# Patient Record
Sex: MALE | URBAN - METROPOLITAN AREA
[De-identification: names, ages, dates, MRNs, and addresses within clinical notes are randomized per-mention and may not be internally consistent; named-entity substitution may affect disease eponyms.]

---

## 2022-07-20 ENCOUNTER — EMERGENCY (EMERGENCY)
Facility: HOSPITAL | Age: 37
LOS: 1 days | Discharge: HOME | End: 2022-07-20
Attending: EMERGENCY MEDICINE | Admitting: EMERGENCY MEDICINE

## 2022-07-20 VITALS
SYSTOLIC BLOOD PRESSURE: 126 MMHG | DIASTOLIC BLOOD PRESSURE: 65 MMHG | HEART RATE: 85 BPM | OXYGEN SATURATION: 98 % | TEMPERATURE: 98 F | RESPIRATION RATE: 18 BRPM

## 2022-07-20 LAB
ALBUMIN SERPL ELPH-MCNC: 4.5 G/DL — SIGNIFICANT CHANGE UP (ref 3.3–5)
ALP SERPL-CCNC: 64 U/L — SIGNIFICANT CHANGE UP (ref 40–120)
ALT FLD-CCNC: 18 U/L — SIGNIFICANT CHANGE UP (ref 4–41)
ANION GAP SERPL CALC-SCNC: 13 MMOL/L — SIGNIFICANT CHANGE UP (ref 7–14)
APPEARANCE UR: CLEAR — SIGNIFICANT CHANGE UP
AST SERPL-CCNC: 24 U/L — SIGNIFICANT CHANGE UP (ref 4–40)
BASOPHILS # BLD AUTO: 0.03 K/UL — SIGNIFICANT CHANGE UP (ref 0–0.2)
BASOPHILS NFR BLD AUTO: 0.3 % — SIGNIFICANT CHANGE UP (ref 0–2)
BILIRUB SERPL-MCNC: 0.3 MG/DL — SIGNIFICANT CHANGE UP (ref 0.2–1.2)
BILIRUB UR-MCNC: NEGATIVE — SIGNIFICANT CHANGE UP
BUN SERPL-MCNC: 11 MG/DL — SIGNIFICANT CHANGE UP (ref 7–23)
CALCIUM SERPL-MCNC: 9.4 MG/DL — SIGNIFICANT CHANGE UP (ref 8.4–10.5)
CHLORIDE SERPL-SCNC: 103 MMOL/L — SIGNIFICANT CHANGE UP (ref 98–107)
CO2 SERPL-SCNC: 27 MMOL/L — SIGNIFICANT CHANGE UP (ref 22–31)
COLOR SPEC: COLORLESS — SIGNIFICANT CHANGE UP
CREAT SERPL-MCNC: 1.3 MG/DL — SIGNIFICANT CHANGE UP (ref 0.5–1.3)
DIFF PNL FLD: NEGATIVE — SIGNIFICANT CHANGE UP
EGFR: 73 ML/MIN/1.73M2 — SIGNIFICANT CHANGE UP
EOSINOPHIL # BLD AUTO: 0.31 K/UL — SIGNIFICANT CHANGE UP (ref 0–0.5)
EOSINOPHIL NFR BLD AUTO: 3.5 % — SIGNIFICANT CHANGE UP (ref 0–6)
GLUCOSE SERPL-MCNC: 86 MG/DL — SIGNIFICANT CHANGE UP (ref 70–99)
GLUCOSE UR QL: NEGATIVE — SIGNIFICANT CHANGE UP
HCT VFR BLD CALC: 47.7 % — SIGNIFICANT CHANGE UP (ref 39–50)
HGB BLD-MCNC: 16.1 G/DL — SIGNIFICANT CHANGE UP (ref 13–17)
IANC: 5.48 K/UL — SIGNIFICANT CHANGE UP (ref 1.8–7.4)
IMM GRANULOCYTES NFR BLD AUTO: 0.2 % — SIGNIFICANT CHANGE UP (ref 0–1.5)
KETONES UR-MCNC: NEGATIVE — SIGNIFICANT CHANGE UP
LEUKOCYTE ESTERASE UR-ACNC: NEGATIVE — SIGNIFICANT CHANGE UP
LYMPHOCYTES # BLD AUTO: 2.31 K/UL — SIGNIFICANT CHANGE UP (ref 1–3.3)
LYMPHOCYTES # BLD AUTO: 25.8 % — SIGNIFICANT CHANGE UP (ref 13–44)
MCHC RBC-ENTMCNC: 29.7 PG — SIGNIFICANT CHANGE UP (ref 27–34)
MCHC RBC-ENTMCNC: 33.8 GM/DL — SIGNIFICANT CHANGE UP (ref 32–36)
MCV RBC AUTO: 87.8 FL — SIGNIFICANT CHANGE UP (ref 80–100)
MONOCYTES # BLD AUTO: 0.82 K/UL — SIGNIFICANT CHANGE UP (ref 0–0.9)
MONOCYTES NFR BLD AUTO: 9.1 % — SIGNIFICANT CHANGE UP (ref 2–14)
NEUTROPHILS # BLD AUTO: 5.48 K/UL — SIGNIFICANT CHANGE UP (ref 1.8–7.4)
NEUTROPHILS NFR BLD AUTO: 61.1 % — SIGNIFICANT CHANGE UP (ref 43–77)
NITRITE UR-MCNC: NEGATIVE — SIGNIFICANT CHANGE UP
NRBC # BLD: 0 /100 WBCS — SIGNIFICANT CHANGE UP
NRBC # FLD: 0 K/UL — SIGNIFICANT CHANGE UP
PH UR: 6.5 — SIGNIFICANT CHANGE UP (ref 5–8)
PLATELET # BLD AUTO: 300 K/UL — SIGNIFICANT CHANGE UP (ref 150–400)
POTASSIUM SERPL-MCNC: 3.9 MMOL/L — SIGNIFICANT CHANGE UP (ref 3.5–5.3)
POTASSIUM SERPL-SCNC: 3.9 MMOL/L — SIGNIFICANT CHANGE UP (ref 3.5–5.3)
PROT SERPL-MCNC: 7.1 G/DL — SIGNIFICANT CHANGE UP (ref 6–8.3)
PROT UR-MCNC: NEGATIVE — SIGNIFICANT CHANGE UP
RBC # BLD: 5.43 M/UL — SIGNIFICANT CHANGE UP (ref 4.2–5.8)
RBC # FLD: 12.8 % — SIGNIFICANT CHANGE UP (ref 10.3–14.5)
SODIUM SERPL-SCNC: 143 MMOL/L — SIGNIFICANT CHANGE UP (ref 135–145)
SP GR SPEC: 1 — SIGNIFICANT CHANGE UP (ref 1–1.05)
UROBILINOGEN FLD QL: SIGNIFICANT CHANGE UP
WBC # BLD: 8.97 K/UL — SIGNIFICANT CHANGE UP (ref 3.8–10.5)
WBC # FLD AUTO: 8.97 K/UL — SIGNIFICANT CHANGE UP (ref 3.8–10.5)

## 2022-07-20 PROCEDURE — G1004: CPT

## 2022-07-20 PROCEDURE — 99285 EMERGENCY DEPT VISIT HI MDM: CPT

## 2022-07-20 PROCEDURE — 74176 CT ABD & PELVIS W/O CONTRAST: CPT | Mod: 26,ME

## 2022-07-20 RX ORDER — KETOROLAC TROMETHAMINE 30 MG/ML
15 SYRINGE (ML) INJECTION ONCE
Refills: 0 | Status: DISCONTINUED | OUTPATIENT
Start: 2022-07-20 | End: 2022-07-20

## 2022-07-20 RX ORDER — SODIUM CHLORIDE 9 MG/ML
1000 INJECTION INTRAMUSCULAR; INTRAVENOUS; SUBCUTANEOUS ONCE
Refills: 0 | Status: COMPLETED | OUTPATIENT
Start: 2022-07-20 | End: 2022-07-20

## 2022-07-20 RX ORDER — IBUPROFEN 200 MG
1 TABLET ORAL
Qty: 28 | Refills: 0
Start: 2022-07-20 | End: 2022-07-26

## 2022-07-20 RX ADMIN — Medication 15 MILLIGRAM(S): at 13:56

## 2022-07-20 RX ADMIN — SODIUM CHLORIDE 1000 MILLILITER(S): 9 INJECTION INTRAMUSCULAR; INTRAVENOUS; SUBCUTANEOUS at 13:56

## 2022-07-20 NOTE — ED PROVIDER NOTE - CLINICAL SUMMARY MEDICAL DECISION MAKING FREE TEXT BOX
38 y/o M w/ no pertinent PMHx presents to ED c/o bilateral right flank pain which began a few days ago. Will evaluate for kidney stones, musculoskeletal pain, and colitis. Will obtain CBC, CMP, CT abdomen pelvis non-contrast, urinalaysis, urine culture, IV fluid bolus. Provide pain medication and reassess. Likely d/c w/ PCP followup.

## 2022-07-20 NOTE — ED PROVIDER NOTE - PROGRESS NOTE DETAILS
DRAKE JUSTIN: Patient signed out to me from Dr. Lyons to f/u CT A/P to r/o renal stones. Ua neg, CT A/P no acute finding. Discussed with attending, patient can be discharged home to f/u with PCP, pt can proceed with his colonoscopy tomorrow. The patient was given verbal and written discharge instructions. Specifically, instructions when to return to the ED and when to seek follow-up from their pcp was discussed. Any specialty follow-up was discussed, including how to make an appointment.  Instructions were discussed in simple, plain language and was understood by the patient. The patient understands that should their symptoms worsen or any new symptoms arise, they should return to the ED immediately for further evaluation. All pt's questions were answered. Patient verbalizes understanding.

## 2022-07-20 NOTE — ED ADULT NURSE NOTE - OBJECTIVE STATEMENT
Received pt to zone 5, A+Ox4, ambulatory. C/O bilateral flank pain x 1 day, Pt denies dysuria, hematuria, melena, hematemesis. Respirations even and unlabored, normal work of breathing, no accessory muscle use, speaking in full clear uninterrupted sentences. ABD is soft, non tender, non distended, pt states his "stomach feels full". Pt denies any chest pain, SOB, headache, dizziness, N/V/D, fever, chills. . 20G to RAC, Labs sent, Medicated as per MD, will continue to monitor.

## 2022-07-20 NOTE — ED PROVIDER NOTE - NSFOLLOWUPINSTRUCTIONS_ED_ALL_ED_FT
Rest, drink plenty of fluids.  Advance activity as tolerated.  Continue all previously prescribed medications as directed.  Follow up with your primary care physician in 48-72 hours- bring copies of your results.  Return to the ER for worsening or persistent symptoms, and/or ANY NEW OR CONCERNING SYMPTOMS. If you have issues obtaining follow up, please call: [phone] to obtain a doctor or specialist who takes your insurance in your area.     Take Motrin 600 mg every 6-8 hours as needed for moderate pain -- take with food.

## 2022-07-20 NOTE — ED PROVIDER NOTE - PATIENT PORTAL LINK FT
You can access the FollowMyHealth Patient Portal offered by [facility] by registering at the following website: [email or web address]. By joining [facility]’s FollowMyHealth portal, you will also be able to view your health information using other applications (apps) compatible with our system.

## 2022-07-20 NOTE — ED PROVIDER NOTE - ATTENDING APP SHARED VISIT CONTRIBUTION OF CARE
DR. BRIONES, ATTENDING MD-  I personally saw the patient with the PA and performed a substantive portion of the visit including all aspects of the medical decision making.    HPI / ROS / PE / MDM written by myself.

## 2022-07-20 NOTE — ED PROVIDER NOTE - OBJECTIVE STATEMENT
38 y/o M w/ no pertinent PMHx presents to ED c/o bilateral right flank pain which began a few days ago. Pt reports pain is intermittent, radiates around to abdomen. He expresses no prior history of such symptoms. He states he has not taken any pain medication to relieve symptoms. Pt is due for a colonoscopy tomorrow for chronic diarrhea. Denies dysuria, hematuria, urinary frequency, testicular pain, and penile discharge.

## 2022-07-20 NOTE — ED PROVIDER NOTE - NS ED ATTENDING STATEMENT MOD
This was a shared visit with the ANA. I reviewed and verified the documentation and independently performed the documented:

## 2022-07-21 LAB
CULTURE RESULTS: SIGNIFICANT CHANGE UP
SPECIMEN SOURCE: SIGNIFICANT CHANGE UP

## 2023-02-15 ENCOUNTER — EMERGENCY (EMERGENCY)
Facility: HOSPITAL | Age: 38
LOS: 1 days | Discharge: HOME | End: 2023-02-15
Admitting: EMERGENCY MEDICINE
Payer: MEDICAID

## 2023-02-15 VITALS
OXYGEN SATURATION: 100 % | RESPIRATION RATE: 16 BRPM | TEMPERATURE: 98 F | SYSTOLIC BLOOD PRESSURE: 127 MMHG | HEART RATE: 70 BPM | DIASTOLIC BLOOD PRESSURE: 84 MMHG

## 2023-02-15 VITALS
OXYGEN SATURATION: 100 % | HEART RATE: 61 BPM | RESPIRATION RATE: 16 BRPM | SYSTOLIC BLOOD PRESSURE: 103 MMHG | DIASTOLIC BLOOD PRESSURE: 74 MMHG

## 2023-02-15 PROBLEM — Z78.9 OTHER SPECIFIED HEALTH STATUS: Chronic | Status: ACTIVE | Noted: 2022-07-20

## 2023-02-15 PROCEDURE — 99284 EMERGENCY DEPT VISIT MOD MDM: CPT

## 2023-02-15 RX ORDER — IBUPROFEN 200 MG
600 TABLET ORAL ONCE
Refills: 0 | Status: COMPLETED | OUTPATIENT
Start: 2023-02-15 | End: 2023-02-15

## 2023-02-15 RX ADMIN — Medication 600 MILLIGRAM(S): at 19:05

## 2023-02-15 NOTE — ED PROVIDER NOTE - PATIENT PORTAL LINK FT
You can access the FollowMyHealth Patient Portal offered by [facility] by registering at the following website: [email or web address]. By joining [employer]’s FollowMyHealth portal, you will also be able to view your health information using other applications (apps) compatible with our system.

## 2023-02-15 NOTE — ED PROVIDER NOTE - NSFOLLOWUPINSTRUCTIONS_ED_ALL_ED_FT
Take motrin 600mg every 8 hours with food.  Drink plenty of fluids.  Follow up with ENT specialist within 1 week.  Return to ED For any worsening pain, difficulty swallowing or breathing.

## 2023-02-15 NOTE — ED ADULT NURSE NOTE - OBJECTIVE STATEMENT
p/t c/o of cough, cold, sore throat and chest congestion for few days, p/t denies any chest pain or sob, nad noted

## 2023-02-15 NOTE — ED PROVIDER NOTE - CLINICAL SUMMARY MEDICAL DECISION MAKING FREE TEXT BOX
38-year-old male no PMHx presents ED complaining of throat pain for the past 3 days.  Patient states pain is more lower in his throat near his neck region.  Pain worse with swallowing.  States coughed a couple times and had some bright red blood sputum. denies any, shortness of breath, night sweats, weight loss, abdominal pain, N/V/D.  Patient states has a strong family history of cancer in his family 3 of his brothers passed away from cancer.   On exam throat is non erythematous, no exudate, lungs clear, vss.  will have rome hammer arrange ENT follow up.  NSAIDs for pain.

## 2023-02-15 NOTE — ED ADULT NURSE NOTE - SUICIDE SCREENING QUESTION 3
Detail Level: Zone
Action 2: Continue
Samples Given: Cosentyx
Continue Regimen: TAC 0.1% cream Apply to affected area body throughout BID 2 weeks on, 1 week off repeat prn flares
No

## 2023-02-15 NOTE — ED PROVIDER NOTE - OBJECTIVE STATEMENT
38-year-old male no PMHx presents ED complaining of throat pain worsening for the past 3 days.  Patient states pain is more lower in his throat near his neck region.  Pain worse with swallowing.  States coughed a couple times and had some bright red blood sputum denies any, shortness of breath, night sweats, weight loss, abdominal pain, N/V/D.  Patient  has a strong family history of cancer in his family 3 of his brothers passed away from cancer.  Pt  has had this pain for about one year- seen a doctor for this and given allergy medicine without any improvement.

## 2023-02-27 PROBLEM — Z00.00 ENCOUNTER FOR PREVENTIVE HEALTH EXAMINATION: Status: ACTIVE | Noted: 2023-02-27

## 2023-03-02 ENCOUNTER — APPOINTMENT (OUTPATIENT)
Dept: OTOLARYNGOLOGY | Facility: CLINIC | Age: 38
End: 2023-03-02
Payer: MEDICAID

## 2023-03-02 VITALS
HEART RATE: 80 BPM | DIASTOLIC BLOOD PRESSURE: 79 MMHG | SYSTOLIC BLOOD PRESSURE: 120 MMHG | BODY MASS INDEX: 21.97 KG/M2 | WEIGHT: 124 LBS | HEIGHT: 63 IN

## 2023-03-02 DIAGNOSIS — Z78.9 OTHER SPECIFIED HEALTH STATUS: ICD-10-CM

## 2023-03-02 DIAGNOSIS — Z80.9 FAMILY HISTORY OF MALIGNANT NEOPLASM, UNSPECIFIED: ICD-10-CM

## 2023-03-02 DIAGNOSIS — R04.2 HEMOPTYSIS: ICD-10-CM

## 2023-03-02 PROCEDURE — 99203 OFFICE O/P NEW LOW 30 MIN: CPT | Mod: 25

## 2023-03-02 PROCEDURE — 31575 DIAGNOSTIC LARYNGOSCOPY: CPT

## 2023-03-02 NOTE — HISTORY OF PRESENT ILLNESS
[de-identified] : COLEEN SCRUGGS is a 38 year year old man who presents to the [facility] with a chief complaint of throat pain. They have had pain in their throat for about 2 weeks. \par Reports has had intermittent coughing since 2018- when lying on back at night and also randomly during the day and described as coughing fits.  \par States had some cough with bloody mucus 2 weeks ago that lasted for 3 days, went to [facility] ER- treated with ibuprofen and referred to ENT.\par Reports globus sensation\par They deny a prior CT neck.\par They deny prior smoking history.\par They deny prior history of alcoholism.\par Throat pain is also when swallowing solids or liquids.\par They had unintentional weight loss in the past 2 months- 3-4 lbs.\par They have altered their diet because of this pain-soft diet\par They deny ear pain. \par Denies Pain when chewing. \par They did not have a prior swallow study in the past 1 year.\par They have seen the following types of providers for this problem: PCP\par They have taken the following medications for this problem: fluticasone and zyrtec without relief \par Doing or taking the following makes the pain better: drinking water with salt and cloves\par Doing the following makes the pain worse: none\par Family history cancer:  throat cancer- father ( 37 years ago), chest cancer- mom (diagnosed 2 weeks ago), colon cancer- brother (diagnosed 6 years ago  ), kidney cancer- brother ( 3 years ago)\par Originally from [facility].

## 2023-03-02 NOTE — ASSESSMENT
[FreeTextEntry1] : Assessment/Plan:\par After a thorough discussion regarding our findings today the patient understands that they have the presumptive diagnosis of chronic cough and new hemoptysis and throat pain.  Due to the hemoptysis and weight loss and previously living in [facility] there is a wide differential and we will start with a CXR. If that is normal we will proceed with a CT chest w/o con.  We will also start him on daily pantoprazole incase GERD is a contributing factor to his throat pain.  I would like to follow up with him in 1 month but will reach out with anything concerning seen on imaging.  Should imaging return negative and no relief with PPI I would likely start him on my chronic cough protocol.\par

## 2023-03-02 NOTE — PROCEDURE
[de-identified] : Procedure: Transnasal flexible laryngoscopy \par Description: Informed consent was obtained from the patient prior to the procedure. The patient was seated in the clinic chair. Topical anesthesia was achieved by first spraying the nasal cavities with 4% lidocaine and 1% phenylephrine. \par \par Exam: This demonstrates a clear vallecula and crisp epiglottis. The aryepiglottic folds are intact and symmetric bilaterally. The hypopharynx does not demonstrate pooling. The interarytenoid space demonstrates no lesions. It does not demonstrate pachydermia. The false vocal folds are symmetric and without lesions or masses. False fold voicing (plica ventricularis) is not noted today. The true vocal folds show normal and symmetric motion bilaterally. There is no paradoxical motion. The right medial edge is crisp and shows no lesions or masses. The left medial edge is crisp and shows no lesions or masses. The mucosal covering is minimally edematous. There is not erythema present today. There are no obvious vascular ectasias present. The vocal processes do not demonstrate granulomas. The subglottis and proximal trachea is clear and unobstructed to the limits of the examination today.

## 2023-03-20 RX ORDER — PANTOPRAZOLE 40 MG/1
40 TABLET, DELAYED RELEASE ORAL DAILY
Qty: 30 | Refills: 4 | Status: ACTIVE | COMMUNITY
Start: 2023-03-20 | End: 1900-01-01

## 2023-04-03 ENCOUNTER — OUTPATIENT (OUTPATIENT)
Dept: OUTPATIENT SERVICES | Facility: HOSPITAL | Age: 38
LOS: 1 days | End: 2023-04-03
Payer: MEDICAID

## 2023-04-03 ENCOUNTER — APPOINTMENT (OUTPATIENT)
Dept: RADIOLOGY | Facility: IMAGING CENTER | Age: 38
End: 2023-04-03
Payer: MEDICAID

## 2023-04-03 ENCOUNTER — APPOINTMENT (OUTPATIENT)
Dept: OTOLARYNGOLOGY | Facility: CLINIC | Age: 38
End: 2023-04-03
Payer: MEDICAID

## 2023-04-03 VITALS
BODY MASS INDEX: 21.97 KG/M2 | WEIGHT: 124 LBS | HEIGHT: 63 IN | SYSTOLIC BLOOD PRESSURE: 100 MMHG | DIASTOLIC BLOOD PRESSURE: 63 MMHG | HEART RATE: 71 BPM

## 2023-04-03 DIAGNOSIS — R05.9 COUGH, UNSPECIFIED: ICD-10-CM

## 2023-04-03 DIAGNOSIS — K21.9 GASTRO-ESOPHAGEAL REFLUX DISEASE WITHOUT ESOPHAGITIS: ICD-10-CM

## 2023-04-03 DIAGNOSIS — Z87.898 PERSONAL HISTORY OF OTHER SPECIFIED CONDITIONS: ICD-10-CM

## 2023-04-03 DIAGNOSIS — R07.0 PAIN IN THROAT: ICD-10-CM

## 2023-04-03 PROCEDURE — 71046 X-RAY EXAM CHEST 2 VIEWS: CPT

## 2023-04-03 PROCEDURE — 31575 DIAGNOSTIC LARYNGOSCOPY: CPT

## 2023-04-03 PROCEDURE — 99213 OFFICE O/P EST LOW 20 MIN: CPT | Mod: 25

## 2023-04-03 PROCEDURE — 71046 X-RAY EXAM CHEST 2 VIEWS: CPT | Mod: 26

## 2023-04-03 NOTE — REASON FOR VISIT
[Subsequent Evaluation] : a subsequent evaluation for [FreeTextEntry2] : throat pain and chronic cough

## 2023-04-03 NOTE — PROCEDURE
[de-identified] : Procedure: Transnasal flexible laryngoscopy \par Description: Informed consent was obtained from the patient prior to the procedure. The patient was seated in the clinic chair. Topical anesthesia was achieved by first spraying the nasal cavities with 4% lidocaine and 1% phenylephrine. \par \par Exam: This demonstrates a clear vallecula and crisp epiglottis. The aryepiglottic folds are intact and symmetric bilaterally. The hypopharynx does not demonstrate pooling. The interarytenoid space demonstrates no lesions. It does not demonstrate pachydermia. The false vocal folds are symmetric and without lesions or masses. False fold voicing (plica ventricularis) is not noted today. The true vocal folds show normal and symmetric motion bilaterally. There is no paradoxical motion. The right medial edge is crisp and shows no lesions or masses. The left medial edge is crisp and shows no lesions or masses. The mucosal covering is minimally edematous. There is not erythema present today. There are no obvious vascular ectasias present. The vocal processes do not demonstrate granulomas. The subglottis and proximal trachea is clear and unobstructed to the limits of the examination today.

## 2023-04-03 NOTE — ASSESSMENT
[FreeTextEntry1] : Assessment/Plan:\par After a thorough discussion regarding our findings today the patient understands that they have the presumptive diagnosis of idiopathic chronic cough. We will get a CXR today.\par \par They are to take 30 mg of prednisone daily for a total of two weeks. The day of their last dose they are to start using an inhaled steroid two puffs twice daily until I discontinue it. Over half of patients with chronic cough will have a complete cessation or near complete cessation of their cough doing this protocol. If they have resolution or near resolution of cough, I will continue them on the inhaled steroid for three more months. At that time they may attempt to slowly taper their dose. Most patients are able to get completely off their inhaled steroid, but if the cough starts to return they are to stay on the maximum dose of 2 puffs twice a day. If they are using QVAR they do not need a spacer, but if using a different inhaled steroid I strongly suggest using a spacer. Regardless, they should rinse their mouth out after each use. I have discussed the risks, benefits, and alternatives to care in regards to the oral steroids. If their insurance does not cover the steroid inhaler I prescribed the following are all acceptable substitutes: \par Qvar redihaler (Beclomethasone) 80 mcg 2 puffs BID\par Flovent HFA (Fluticasone propionate) 220 mcg 2 puffs BID\par Alvesco (Ciclesonide) 160 mcg 2 puffs BID\par Pulmicort (Budesomide) 180 mcg 2 puffs BID\par Asthmanex HFA (Mometasone) 200 mcg 2 puffs BID\par \par If cough does not resolve they will call to let me know and I will start them on gabapentin and get CT chest w/o con and schedule further followup.\par \par They are to continue PPI 40mg daily.

## 2023-04-03 NOTE — HISTORY OF PRESENT ILLNESS
[de-identified] : COLEEN SCRUGGS is a 38 year year old male who presents to the [facility] for follow up of his throat pain, GERD, and chronic cough.  I last saw the patient on 3/2/23. I ordered him for a  CXR and if that was normal stated we will proceed with a CT chest w/o con. We will also start him on daily pantoprazole incase GERD is a contributing factor to his throat pain. \par Has not gotten CXR done yet.\par Report use of Pantoprazole daily. \par Reports cough has improved a little bit but still causes slight throat pain and slight pain while swallowing when the coughing is persistent. \par Reports intermittent globus sensation and frequent throat clearing. \par Denies dysphagia, dyspnea, dysphonia, recent fevers or ear/nose/throat infections, chills, night sweats or weight loss.\par \par Previously reported: throat pain. They have had pain in their throat for about 2 weeks. \par Reports has had intermittent coughing since 2018- when lying on back at night and also randomly during the day and described as coughing fits.  \par States had some cough with bloody mucus 2 weeks ago that lasted for 3 days, went to [facility] ER- treated with ibuprofen and referred to ENT.\par Reports globus sensation\par They deny a prior CT neck.\par They deny prior smoking history.\par They deny prior history of alcoholism.\par Throat pain is also when swallowing solids or liquids.\par They had unintentional weight loss in the past 2 months- 3-4 lbs.\par They have altered their diet because of this pain-soft diet\par They deny ear pain. \par Denies Pain when chewing. \par They did not have a prior swallow study in the past 1 year.\par They have seen the following types of providers for this problem: PCP\par They have taken the following medications for this problem: fluticasone and zyrtec without relief \par Doing or taking the following makes the pain better: drinking water with salt and cloves\par Doing the following makes the pain worse: none\par Family history cancer:  throat cancer- father ( 37 years ago), chest cancer- mom (diagnosed 2 weeks ago), colon cancer- brother (diagnosed 6 years ago  7/22), kidney cancer- brother ( 3 years ago)\par [place]

## 2024-04-09 ENCOUNTER — APPOINTMENT (OUTPATIENT)
Dept: CT IMAGING | Facility: CLINIC | Age: 39
End: 2024-04-09
Payer: MEDICAID

## 2024-04-09 PROCEDURE — 74177 CT ABD & PELVIS W/CONTRAST: CPT

## 2024-04-15 ENCOUNTER — APPOINTMENT (OUTPATIENT)
Dept: OTOLARYNGOLOGY | Facility: CLINIC | Age: 39
End: 2024-04-15
Payer: MEDICAID

## 2024-04-15 VITALS
BODY MASS INDEX: 21.97 KG/M2 | HEART RATE: 77 BPM | SYSTOLIC BLOOD PRESSURE: 122 MMHG | WEIGHT: 124 LBS | OXYGEN SATURATION: 99 % | DIASTOLIC BLOOD PRESSURE: 87 MMHG | HEIGHT: 63 IN

## 2024-04-15 DIAGNOSIS — R07.0 PAIN IN THROAT: ICD-10-CM

## 2024-04-15 DIAGNOSIS — K21.9 GASTRO-ESOPHAGEAL REFLUX DISEASE W/OUT ESOPHAGITIS: ICD-10-CM

## 2024-04-15 PROCEDURE — 31575 DIAGNOSTIC LARYNGOSCOPY: CPT

## 2024-04-15 PROCEDURE — 99214 OFFICE O/P EST MOD 30 MIN: CPT | Mod: 25

## 2024-04-15 RX ORDER — OMEPRAZOLE 40 MG/1
40 CAPSULE, DELAYED RELEASE ORAL DAILY
Qty: 30 | Refills: 4 | Status: ACTIVE | COMMUNITY
Start: 2024-04-15 | End: 1900-01-01

## 2024-04-15 RX ORDER — PREDNISONE 10 MG/1
10 TABLET ORAL
Qty: 42 | Refills: 0 | Status: COMPLETED | COMMUNITY
Start: 2023-04-03 | End: 2024-04-15

## 2024-04-15 RX ORDER — PANTOPRAZOLE 40 MG/1
40 TABLET, DELAYED RELEASE ORAL DAILY
Qty: 30 | Refills: 4 | Status: COMPLETED | COMMUNITY
Start: 2023-03-02 | End: 2024-04-15

## 2024-04-15 RX ORDER — FLUTICASONE PROPIONATE 220 UG/1
220 AEROSOL, METERED RESPIRATORY (INHALATION) TWICE DAILY
Qty: 1 | Refills: 5 | Status: COMPLETED | COMMUNITY
Start: 2023-04-03 | End: 2024-04-15

## 2024-04-15 NOTE — PROCEDURE
[de-identified] : Procedure: Transnasal flexible laryngoscopy  Description: Informed consent was obtained from the patient prior to the procedure. The patient was seated in the clinic chair. Topical anesthesia was achieved by first spraying the nasal cavities with 4% lidocaine and 1% phenylephrine.   Exam: This demonstrates a clear vallecula and crisp epiglottis. The aryepiglottic folds are intact and symmetric bilaterally. The hypopharynx does not demonstrate pooling. The interarytenoid space demonstrates no lesions. It does not demonstrate pachydermia. The false vocal folds are symmetric and without lesions or masses. False fold voicing (plica ventricularis) is not noted today. The true vocal folds show normal and symmetric motion bilaterally. There is no paradoxical motion. The right medial edge is crisp and shows no lesions or masses. The left medial edge is crisp and shows no lesions or masses. The mucosal covering is minimally edematous. There is not erythema present today. There are no obvious vascular ectasias present. The vocal processes do not demonstrate granulomas. The subglottis and proximal trachea is clear and unobstructed to the limits of the examination today.

## 2024-04-15 NOTE — HISTORY OF PRESENT ILLNESS
[de-identified] :  COLEEN SCRUGGS is a 39 year old male who presents to the [facility] for follow up of his throat pain, GERD, and chronic cough. I last saw the patient on 4/3/23. At that time I put him on my chronic cough protocol, continued PPI daily, and ordered for CXR. He is using PPI PRN and no longer using Flovent. He complains of pain to the left side of his throat for the last month. When he speaks loudly his voice gets hoarse and it is painful to speak loudly.   Chest Xray PA/Lat performed on 4/3/23 shows: No radiographic evidence of active chest disease.  Previously reported: throat pain. They have had pain in their throat for about 2 weeks. Reports has had intermittent coughing since 2018- when lying on back at night and also randomly during the day and described as coughing fits. States had some cough with bloody mucus 2 weeks ago that lasted for 3 days, went to [facility] ER- treated with ibuprofen and referred to ENT. Reports globus sensation They deny a prior CT neck. They deny prior smoking history. They deny prior history of alcoholism. Throat pain is also when swallowing solids or liquids. They had unintentional weight loss in the past 2 months- 3-4 lbs. They have altered their diet because of this pain-soft diet They deny ear pain. Denies Pain when chewing. They did not have a prior swallow study in the past 1 year. They have seen the following types of providers for this problem: PCP They have taken the following medications for this problem: fluticasone and zyrtec without relief Doing or taking the following makes the pain better: drinking water with salt and cloves Doing the following makes the pain worse: none Family history cancer: throat cancer- father ( 37 years ago), chest cancer- mom (diagnosed 2 weeks ago), colon cancer- brother (diagnosed 6 years ago  ), kidney cancer- brother ( 3 years ago) Originally from [facility].